# Patient Record
(demographics unavailable — no encounter records)

---

## 2020-05-21 NOTE — RADIOLOGY REPORT (SQ)
EXAM DESCRIPTION:  MRI HEAD COMBO



IMAGES COMPLETED DATE/TIME:  5/21/2020 8:08 am



REASON FOR STUDY:  ASYMMETRICAL SENSORINEURAL HEARING LOSS (H90.5) H90.5  UNSPECIFIED SENSORINEURAL H
EARING LOSS



COMPARISON:  CT dated 3/11/2020.



TECHNIQUE:  Multiplanar imaging includes non-contrasted T1, T2, FLAIR, diffusion with ADC map and pos
t gadolinium contrast sequences.  Additional thin slice images with and without gadolinium contrast a
cquired in the posterior fossa. Images stored on PACS.



CONTRAST TYPE AND DOSE:  20 mL Dotarem.



RENAL FUNCTION:  Not indicated.  ACR Type II contrast agent associated with few, if any, unconfounded
 cases of NSF



LIMITATIONS:  None.



FINDINGS:  ANATOMY: No anomalies. Normal vascular flow voids. Pituitary fossa normal.

CSF SPACES: Normal in size and contour.

CEREBRUM: Sulci and gyri normal in size and contour. Normal white matter signal  on FLAIR imaging.  N
o hemorrhage. No edema, masses or mass effect.  No enhancing lesions.

POSTERIOR FOSSA: No signal alteration. No hemorrhage. No edema, masses or mass effect.  Internal yassine
tory canals, cerebello-pontine angles, mastoids normal. No enhancing lesions. Detailed imaging of the
 5th, 7th, and 8th nerves and [place] within normal limits.

DIFFUSION IMAGING: Negative for acute or sub-acute infarction.

ORBITS: No masses. Globes normal.

PARANASAL SINUSES: No fluid levels.  Mucosa normal.

OTHER: No other significant finding.



IMPRESSION:  NORMAL MRI OF THE BRAIN AND POSTERIOR FOSSA WITHOUT AND WITH INTRAVENOUS GADOLINIUM CONT
RAST.



TECHNICAL DOCUMENTATION:  JOB ID:  [id number]

 2011 [employer]- All Rights Reserved



Reading location - IP/workstation name: [email or web address]

## 2020-09-09 NOTE — RADIOLOGY REPORT (SQ)
EXAM DESCRIPTION:  CT ABD/PELVIS NO ORAL OR IV



IMAGES COMPLETED DATE/TIME:  9/9/2020 8:51 am



REASON FOR STUDY:  Left upper quadrant abdominal pain



COMPARISON:  None.



TECHNIQUE:  CT scan of the abdomen and pelvis performed without intravenous or oral contrast. Images 
reviewed with lung, soft tissue, and bone windows. Reconstructed coronal and sagittal MPR images revi
ewed. All images stored on PACS.

All CT scanners at this facility use dose modulation, iterative reconstruction, and/or weight based d
osing when appropriate to reduce radiation dose to as low as reasonably achievable (ALARA).

[facility]: Dose Right  [facility]: CareDose    [facility]: Dose Right    [facility]: Teradose 4D    [facility]: Smart FiberSensing



RADIATION DOSE:  CT Rad equipment meets quality standard of care and radiation dose reduction techniq
ues were employed. CTDIvol: 16.6 mGy. DLP: 937 mGy-cm.



LIMITATIONS:  None.



FINDINGS:  LOWER CHEST: Atherosclerotic calcification of the aortic valve leaflets.

NON-CONTRASTED LIVER, SPLEEN, ADRENALS: Evaluation is limited due to the absence of intravenous contr
ast.  The morphology of the liver is noncirrhotic.  The spleen is borderline enlarged and it measures
 13.4 cm in AP diameter.  The diffuse nodular enlargement of the left adrenal gland could represent a
denomatous hyperplasia.  There is no discrete right adrenal nodule.

PANCREAS: No acute gross abnormality of the pancreas.

GALLBLADDER: No abnormality that is apparent on CT.

RIGHT KIDNEY AND URETER: Evaluation is limited due to the absence of intravenous contrast.  There is 
no hydronephrosis, nephrolithiasis, hydroureter or ureterolithiasis.

LEFT KIDNEY AND URETER: Evaluation is limited due to the absence of intravenous contrast.  There is a
symmetric enlargement of the kidney, stranding of the perinephric fat and moderate to severe hydronep
hrosis with an abrupt transition in the caliber between the dilated renal pelvis and the nondilated u
reter at the ureteropelvic junction (image 48 of series 3).  There is no discrete nephrolithiasis or 
ureterolithiasis.

AORTA AND RETROPERITONEUM: Variant duplication of the IVC.  The abdominal aorta is normal in caliber.
  There is no retroperitoneal adenopathy, hemorrhage or mass.

BOWEL AND PERITONEAL CAVITY: Colonic diverticulosis without diverticulitis.  There is no bowel obstru
ction, bowel wall thickening or pericolonic/ perienteric inflammation.  There is no mesenteric adenop
athy, free intraperitoneal fluid or mesenteric/ omental inflammation.

APPENDIX: Normal.

PELVIS, BLADDER, AND ABDOMINAL WALL:The urinary bladder is contracted.  There is no sizable abdominal
 wall mass or hernia.

BONES: Degenerative spondylosis and facet arthropathy of the lumbar spine.

OTHER: No other significant finding.



IMPRESSION:  1. There is asymmetric enlargement of the left kidney, stranding of the left perinephric
 fat and moderate to severe left-sided hydronephrosis with an abrupt transition in the caliber betwee
n the dilated renal pelvis and the nondilated ureter at the ureteropelvic junction (image 48 of serie
s 3).  There is no discrete nephrolithiasis or ureterolithiasis.  The aforementioned findings could r
epresent a stricture at the ureterovesicular junction or an obstructive calculus that is imperceptibl
e on CT.

2.  Varying duplication of the IVC.

3. Colonic diverticulosis without diverticulitis.



COMMENT:  Quality ID # 436: Final reports with documentation of one or more dose reduction techniques
 (e.g., Automated exposure control, adjustment of the mA and/or kV according to patient size, use of 
iterative reconstruction technique)



TECHNICAL DOCUMENTATION:  JOB ID:  [id number]

 2011 [employer]- All Rights Reserved



Reading location - IP/workstation name: [email or web address]

## 2020-09-09 NOTE — RADIOLOGY REPORT (SQ)
EXAM DESCRIPTION:  CT ABD/PELVIS WITH IV ONLY



IMAGES COMPLETED DATE/TIME:  9/9/2020 11:12 am



REASON FOR STUDY:  abn non-contrast CT abd/pelvis



COMPARISON:  CT of the abdomen pelvis without contrast from 9/9/2020.



TECHNIQUE:  CT scan of the abdomen and pelvis performed using helical scanning technique with dynamic
 intravenous contrast injection.  No oral contrast. Images reviewed with lung, soft tissue, and bone 
windows. Reconstructed coronal and sagittal MPR images reviewed. Delayed images for evaluation of the
 urinary system also acquired. All images stored on PACS.

All CT scanners at this facility use dose modulation, iterative reconstruction, and/or weight based d
osing when appropriate to reduce radiation dose to as low as reasonably achievable (ALARA).

[facility]: Dose Right  [facility]: CareDose    [facility]: Dose Right    [facility]: Teradose 4D    [facility]: Smart Technologies



CONTRAST TYPE AND DOSE:  Contrast/concentration: Isovue 350.00 mmol/ml; Total Contrast Delivered: 100
.0 ml; Total Saline Delivered: 69.9 ml



RENAL FUNCTION:  Creatinine 1.18 milligrams/deciliter.



RADIATION DOSE:  CT Rad equipment meets quality standard of care and radiation dose reduction techniq
ues were employed. CTDIvol: 15.2 - 19.3 mGy. DLP: 1894 mGy-cm.



LIMITATIONS:  None.



FINDINGS:  LOWER CHEST: No acute findings.

LIVER: The morphology of the liver is noncirrhotic.  The portal veins are patent.  There is no hepati
c mass.

SPLEEN: No the spleen is borderline enlarged.  There is no splenic mass.

PANCREAS: No acute gross abnormality of the pancreas.

GALLBLADDER: No abnormality that is apparent on CT.

ADRENAL GLANDS: Unchanged diffuse nodular enlargement of the left adrenal gland.

RIGHT KIDNEY AND URETER:  Simple cyst in the anterior cortex of the lower pole of the kidney that kayli
sures 2.1 x 1.4 cm.  There is no solid mass, hydronephrosis, nephrolithiasis, hydroureter or ureterol
ithiasis.

LEFT KIDNEY AND URETER: There is re- demonstration of asymmetric enlargement of the kidney, moderate 
to severe hydronephrosis, delayed excretion of the intravenous contrast and stranding of the perineph
sammy fat ; these findings are secondary to a hyperdensity of the ureter say color junction (image 40 o
f series 601) that has an attenuation of 54 Hounsfield units (26 Hounsfield units on the correlative 
noncontrast CT).  There is no definite nephrolithiasis or ureterolithiasis.

AORTA AND VESSELS: Variant duplication of the IVC.  The abdominal aorta is nonaneurysmal.

RETROPERITONEUM: No retroperitoneal adenopathy, hemorrhage or mass.

BOWEL AND PERITONEAL CAVITY: No bowel obstruction, bowel wall thickening or pericolonic/ perienteric 
inflammation.  No mesenteric adenopathy, free intraperitoneal fluid or mesenteric/omental inflammatio
n.

APPENDIX: Normal.

PELVIS: No abnormality.

ABDOMINAL WALL: No mass or hernia.

BONES: No acute findings.

OTHER: No other finding.



IMPRESSION:  Nodular hyperdense lesion in the left ureterovesicular junction that on correlation with
 the recent noncontrast CT demonstrates mild enhancement and results in moderate to severe ipsilatera
l hydronephrosis, enlargement of the kidney, delayed excretion of the intravenous contrast and strand
ing of the perinephric fat.  Differential considerations include urothelial carcinoma, sloughed papil
la and an ureteral papilloma.



TECHNICAL DOCUMENTATION:  JOB ID:  [id number]

Quality ID # 436: Final reports with documentation of one or more dose reduction techniques (e.g., Au
tomated exposure control, adjustment of the mA and/or kV according to patient size, use of iterative 
reconstruction technique)

 2011 [employer]- All Rights Reserved



Reading location - IP/workstation name: [email or web address]

## 2020-09-09 NOTE — ER DOCUMENT REPORT
ED General





- General


Chief Complaint: Abdominal Pain


Stated Complaint: LEFT FLANK PAIN


Time Seen by Provider: 09/09/20 07:35


Primary Care Provider: 


DI ADAM DO [ASSOCIATE] - Follow up as needed


TRAVEL OUTSIDE OF THE [place] IN LAST 30 DAYS: No





- HPI


Notes: 





Chief complaint: Left flank pain and left upper quadrant abdominal pain





History of present illness: 65-year-old male with positive family history of 

kidney stones but no personal history of this entity awakened this morning 

around 4:30 AM with mild left upper quadrant and left flank pain.  This was 

initially intermittent and has now become steady presently reaching an intensity

 of 8/10.  Associated nausea without vomiting.  No dysuria, gross hematuria or 

urinary frequency.  No fever or chills.  Patient took some Gas-X initially with 

no relief of his discomfort.





- Related Data


Allergies/Adverse Reactions: 


                                        





No Known Allergies Allergy (Unverified 03/11/20 22:17)


   








Home Medications: flonase, meclizine-prn





Past Medical History





- General


Information source: Patient, Relative, [facility] Records





- Social History


Smoking Status: Current Every Day Smoker


Frequency of alcohol use: Occasional


Drug Abuse: None


Family History: Reviewed & Not Pertinent





- Past Medical History


Cardiac Medical History: Reports: None


Pulmonary Medical History: Reports: None


Endocrine Medical History: Denies: Hx Diabetes Mellitus Type 1, Hx Diabetes Jenifer

litus Type 2


Renal/ Medical History: Reports: None


Malignancy Medical History: Reports None


GI Medical History: Reports: None


Psychiatric Medical History: Reports: None


Surgical Hx: Negative





Review of Systems





- Review of Systems


Notes: 





Constitutional: Negative for fever.


HENT: Negative for sore throat.


Eyes: Negative for visual changes.


Cardiovascular: Negative for chest pain.


Respiratory: Negative for shortness of breath.


Gastrointestinal: As per HPI.


Genitourinary: As per HPI.


Musculoskeletal: Negative for back pain.


Skin: Negative for rash.


Neurological: Negative for headaches, weakness or numbness.





10 point ROS negative except as marked above and in HPI.








Physical Exam





- Vital signs


Vitals: 


                                        











Temp Pulse Resp BP Pulse Ox


 


 98.5 F   62   16   148/101 H  93 


 


 09/09/20 06:20  09/09/20 06:20  09/09/20 06:20  09/09/20 06:20  09/09/20 06:20














- Notes


Notes: 











GENERAL: Well-developed well-nourished male approximately stated age appearing 

in moderate discomfort.





SKIN: Good turgor no rashes.





HEAD: Normocephalic atraumatic.





EYES: PERRLA.  EOMI.  Conjunctivae and sclerae clear.





EARS: CANALS AND TMS CLEAR.





NOSE: CLEAR.





MOUTH: Moist mucosa.  Good dentition.  No stridor or edema.  No drooling.





NECK: Supple.  No masses or thyromegaly.  No adenopathy.  Carotids 2+ without br

uits.  No JVD.





BACK: Symmetrical without tenderness.





CHEST: Respirations unlabored.  Breath sounds clear and symmetrical.





HEART: Regular rhythm.  No murmur gallop or rub.





ABDOMEN: Soft nontender without masses, organomegaly or rebound.  Bowel sounds 

normally active.  No bruits.





GENITALIA: Deferred.





EXTREMITIES: No edema.  No calf tenderness.  Cap refill less than 1.5 seconds.  

Dorsalis pedis and posterior tibial pulses 3+ and symmetrical.





NEUROLOGICAL: GCS 15.  Alert and oriented x3.  Normal gait.  Fluent speech.  

Cranial nerves II through XII intact.  Sensorimotor and cerebellar normal.  

Normal tone.





PSYCHIATRIC: Appropriate affect.





Course





- Re-evaluation


Re-evalutation: 





09/09/20 12:37


Patient presented with what appeared to be typical renal colic on the left.  

Urinalysis is unremarkable.  Creatinine normal.  White count was 12.7 with a 

normal hemoglobin.





Patient received IV fluids and IV Toradol with initial relief of pain.  

Subsequently had recurrence of pain and required administration of IV Dilaudid.





CT urogram suggested high-grade UPJ obstruction left with no clear-cut stone 

visualized.  Per recommendation of radiology went back he has been IV contrast. 

 He appears to have some sort of a high density nodular lesion in the area 

obstruction.





I have requested consultation with Dr. Richards with urology service at [facility] because we have no urologist on call here today.  I am 

awaiting return call.  Findings have been discussed with patient at this time.


09/09/20 15:33


Dr. Richards feels patient is stable for outpatient follow-up in the office.  We 

have given patient the office number and advised him to call tomorrow to arrange

 an appointment.  He will be discharged on Percocet and Zofran.





- Vital Signs


Vital signs: 


                                        











Temp Pulse Resp BP Pulse Ox


 


 98.2 F   53 L  14   139/86 H  91 L


 


 09/09/20 15:14  09/09/20 15:14  09/09/20 10:27  09/09/20 15:14  09/09/20 15:14














- Laboratory


Result Diagrams: 


                                 09/09/20 08:00





                                 09/09/20 08:00


Laboratory results interpreted by me: 


                                        











  09/09/20 09/09/20





  08:00 08:00


 


WBC  12.7 H 


 


Plt Count  140 L 


 


Lymph % (Auto)  6.6 L 


 


Absolute Neuts (auto)  11.3 H 


 


Seg Neutrophils %  89.3 H 


 


Chloride   109 H


 


Carbon Dioxide   21 L


 


Glucose   142 H














- Diagnostic Test


Radiology reviewed: Reports reviewed





Discharge





- Discharge


Clinical Impression: 


 Ureteropelvic junction (UPJ) obstruction, left





Condition: Stable


Disposition: HOME, SELF-CARE


Additional Instructions: 


Return here as needed for new or worsening symptoms:





Pain that is worsening or unimproved


Uncontrolled vomiting


High fever or shaking chills


Overall worsening





Call referral urologist office tomorrow to confirm appointment for 8:30 AM on 

Friday September 11 with Dr. Cornelius Richards


Prescriptions: 


Oxycodone HCl/Acetaminophen [Percocet 5-325 mg Tablet] 1 - 2 tab PO Q4H PRN #15 

tablet


 PRN Reason: 


Ondansetron [Zofran Odt 4 mg Tablet] 1 - 2 tab PO Q4H PRN #15 tab.rapdis


 PRN Reason: For Nausea/Vomiting


Referrals: 


DI ADAM DO [ASSOCIATE] - Follow up as needed


CORNELIUS RICHARDS MD [NO LOCAL MD] - Follow up as needed

## 2020-09-12 NOTE — RADIOLOGY REPORT (SQ)
Abdomen x-ray single view on 9/12/2020 at 7:19 AM



CLINICAL INDICATION: Left-sided back pain



COMPARISON: CT from 9/9/2020



FINDINGS: Calcification in the pelvis is consistent with a

phlebolith. Bowel gas pattern is unremarkable. No increased stool

to suggest constipation is noted. No other abnormal calcification

or mass effect is noted. No bony normality is noted.



IMPRESSION: Nonspecific abdomen.